# Patient Record
Sex: FEMALE | Race: WHITE | NOT HISPANIC OR LATINO | URBAN - METROPOLITAN AREA
[De-identification: names, ages, dates, MRNs, and addresses within clinical notes are randomized per-mention and may not be internally consistent; named-entity substitution may affect disease eponyms.]

---

## 2018-04-28 ENCOUNTER — HOSPITAL ENCOUNTER (EMERGENCY)
Facility: HOSPITAL | Age: 48
Discharge: HOME | End: 2018-04-28
Attending: EMERGENCY MEDICINE
Payer: MEDICARE

## 2018-04-28 VITALS
RESPIRATION RATE: 16 BRPM | TEMPERATURE: 98 F | WEIGHT: 90 LBS | DIASTOLIC BLOOD PRESSURE: 75 MMHG | OXYGEN SATURATION: 97 % | BODY MASS INDEX: 17.67 KG/M2 | HEART RATE: 88 BPM | HEIGHT: 60 IN | SYSTOLIC BLOOD PRESSURE: 106 MMHG

## 2018-04-28 DIAGNOSIS — R20.9 ALTERATION IN SENSORY PERCEPTION: ICD-10-CM

## 2018-04-28 DIAGNOSIS — R20.2 PARESTHESIAS: ICD-10-CM

## 2018-04-28 DIAGNOSIS — E87.1 HYPONATREMIA: Primary | ICD-10-CM

## 2018-04-28 DIAGNOSIS — F19.10 DRUG ABUSE: ICD-10-CM

## 2018-04-28 LAB
ALBUMIN SERPL BCP-MCNC: 4 G/DL
ALP SERPL-CCNC: 65 U/L
ALT SERPL W/O P-5'-P-CCNC: 21 U/L
AMPHET+METHAMPHET UR QL: NORMAL
ANION GAP SERPL CALC-SCNC: 6 MMOL/L
AST SERPL-CCNC: 24 U/L
BACTERIA #/AREA URNS HPF: NORMAL /HPF
BARBITURATES UR QL SCN>200 NG/ML: NEGATIVE
BASOPHILS # BLD AUTO: 0.07 K/UL
BASOPHILS NFR BLD: 1.2 %
BENZODIAZ UR QL SCN>200 NG/ML: NEGATIVE
BILIRUB SERPL-MCNC: 0.1 MG/DL
BILIRUB UR QL STRIP: NEGATIVE
BUN SERPL-MCNC: 7 MG/DL
BZE UR QL SCN: NEGATIVE
CALCIUM SERPL-MCNC: 9.2 MG/DL
CANNABINOIDS UR QL SCN: NORMAL
CHLORIDE SERPL-SCNC: 93 MMOL/L
CLARITY UR: CLEAR
CO2 SERPL-SCNC: 27 MMOL/L
COLOR UR: YELLOW
CREAT SERPL-MCNC: 0.6 MG/DL
CREAT UR-MCNC: 116.7 MG/DL
DIFFERENTIAL METHOD: ABNORMAL
EOSINOPHIL # BLD AUTO: 0.1 K/UL
EOSINOPHIL NFR BLD: 2.3 %
ERYTHROCYTE [DISTWIDTH] IN BLOOD BY AUTOMATED COUNT: 11.7 %
EST. GFR  (AFRICAN AMERICAN): >60 ML/MIN/1.73 M^2
EST. GFR  (NON AFRICAN AMERICAN): >60 ML/MIN/1.73 M^2
GLUCOSE SERPL-MCNC: 76 MG/DL
GLUCOSE UR QL STRIP: NEGATIVE
HCT VFR BLD AUTO: 35.5 %
HGB BLD-MCNC: 12.4 G/DL
HGB UR QL STRIP: NEGATIVE
HYALINE CASTS #/AREA URNS LPF: 0 /LPF
IMM GRANULOCYTES # BLD AUTO: 0.01 K/UL
IMM GRANULOCYTES NFR BLD AUTO: 0.2 %
KETONES UR QL STRIP: ABNORMAL
LEUKOCYTE ESTERASE UR QL STRIP: NEGATIVE
LYMPHOCYTES # BLD AUTO: 2.1 K/UL
LYMPHOCYTES NFR BLD: 36.9 %
MAGNESIUM SERPL-MCNC: 1.9 MG/DL
MCH RBC QN AUTO: 30 PG
MCHC RBC AUTO-ENTMCNC: 34.9 G/DL
MCV RBC AUTO: 86 FL
MICROSCOPIC COMMENT: NORMAL
MONOCYTES # BLD AUTO: 0.5 K/UL
MONOCYTES NFR BLD: 9 %
NEUTROPHILS # BLD AUTO: 2.8 K/UL
NEUTROPHILS NFR BLD: 50.4 %
NITRITE UR QL STRIP: NEGATIVE
NRBC BLD-RTO: 0 /100 WBC
OPIATES UR QL SCN: NEGATIVE
PCP UR QL SCN>25 NG/ML: NEGATIVE
PH UR STRIP: 7 [PH] (ref 5–8)
PLATELET # BLD AUTO: 255 K/UL
PMV BLD AUTO: 9.9 FL
POTASSIUM SERPL-SCNC: 4 MMOL/L
PROT SERPL-MCNC: 6.9 G/DL
PROT UR QL STRIP: ABNORMAL
RBC # BLD AUTO: 4.13 M/UL
RBC #/AREA URNS HPF: 1 /HPF (ref 0–4)
SODIUM SERPL-SCNC: 126 MMOL/L
SP GR UR STRIP: 1.02 (ref 1–1.03)
TOXICOLOGY INFORMATION: NORMAL
URN SPEC COLLECT METH UR: ABNORMAL
UROBILINOGEN UR STRIP-ACNC: NEGATIVE EU/DL
WBC # BLD AUTO: 5.64 K/UL
WBC #/AREA URNS HPF: 1 /HPF (ref 0–5)

## 2018-04-28 PROCEDURE — 81000 URINALYSIS NONAUTO W/SCOPE: CPT

## 2018-04-28 PROCEDURE — 83735 ASSAY OF MAGNESIUM: CPT

## 2018-04-28 PROCEDURE — 80053 COMPREHEN METABOLIC PANEL: CPT

## 2018-04-28 PROCEDURE — 70450 CT HEAD/BRAIN W/O DYE: CPT | Mod: TC

## 2018-04-28 PROCEDURE — 70450 CT HEAD/BRAIN W/O DYE: CPT | Mod: 26,,, | Performed by: RADIOLOGY

## 2018-04-28 PROCEDURE — 85025 COMPLETE CBC W/AUTO DIFF WBC: CPT

## 2018-04-28 PROCEDURE — 80307 DRUG TEST PRSMV CHEM ANLYZR: CPT

## 2018-04-28 PROCEDURE — 99284 EMERGENCY DEPT VISIT MOD MDM: CPT | Mod: 25

## 2018-04-28 RX ORDER — ESCITALOPRAM OXALATE 20 MG/1
20 TABLET ORAL DAILY
COMMUNITY

## 2018-04-28 RX ORDER — QUETIAPINE FUMARATE 300 MG/1
TABLET, FILM COATED ORAL
COMMUNITY

## 2018-04-28 RX ORDER — TRAZODONE HYDROCHLORIDE 100 MG/1
400 TABLET ORAL NIGHTLY
COMMUNITY

## 2018-04-28 RX ORDER — OXCARBAZEPINE 600 MG/1
600 TABLET, FILM COATED ORAL 2 TIMES DAILY
COMMUNITY

## 2018-04-28 NOTE — ED PROVIDER NOTES
Encounter Date: 4/28/2018       History     Chief Complaint   Patient presents with    Arm Problem     left arm numb from elbow down, started yesterday     36-48 hours of left arm and leg pain decreased sensation   Left arm from elbow inferior to hand involving the thumb, index and long fingers   Left leg post distal calf and foot     Denies weakness and none found on detailed exam of upper and lower extremity   Complains of headache bifrontal   Reports cough - non productive   No other chest symptoms - no hemoptysis             Review of patient's allergies indicates:   Allergen Reactions    Vancomycin analogues      Past Medical History:   Diagnosis Date    Anxiety     Bipolar 1 disorder     Depression     Overdose 2015    Suicidal ideation 2015     Past Surgical History:   Procedure Laterality Date    HYSTERECTOMY      TONSILLECTOMY      WRIST SURGERY Right      History reviewed. No pertinent family history.  Social History   Substance Use Topics    Smoking status: Current Every Day Smoker    Smokeless tobacco: Not on file    Alcohol use Yes      Comment: social     Review of Systems   Constitutional: Negative.    Respiratory: Positive for cough. Negative for shortness of breath, wheezing and stridor.    Cardiovascular: Negative for chest pain, palpitations and leg swelling.   Gastrointestinal: Negative for abdominal pain, constipation, nausea and vomiting.   Neurological: Positive for numbness and headaches. Negative for dizziness, tremors, seizures, syncope, facial asymmetry, speech difficulty, weakness and light-headedness.   Hematological: Negative.    All other systems reviewed and are negative.      Physical Exam     Initial Vitals [04/28/18 1517]   BP Pulse Resp Temp SpO2   92/79 (!) 120 20 98.3 °F (36.8 °C) 95 %      MAP       83.33         Physical Exam    Nursing note and vitals reviewed.  Constitutional: She appears well-developed and well-nourished. She is not diaphoretic. No distress.    HENT:   Head: Normocephalic and atraumatic.   Nose: Nose normal.   Mouth/Throat: Oropharynx is clear and moist. No oropharyngeal exudate.   Eyes: Conjunctivae and EOM are normal. Pupils are equal, round, and reactive to light. Right eye exhibits no discharge. Left eye exhibits no discharge. No scleral icterus.   Neck: Normal range of motion. Neck supple.   Cardiovascular: Normal rate, regular rhythm, normal heart sounds and intact distal pulses. Exam reveals no gallop and no friction rub.    No murmur heard.  Pulmonary/Chest: Breath sounds normal. No respiratory distress. She has no wheezes. She has no rhonchi. She has no rales.   Abdominal: Soft. Bowel sounds are normal. She exhibits no distension and no mass. There is no tenderness. There is no rebound and no guarding.   Musculoskeletal: Normal range of motion. She exhibits no edema or tenderness.   Lymphadenopathy:     She has no cervical adenopathy.   Neurological: She is alert and oriented to person, place, and time. She has normal strength. No cranial nerve deficit or sensory deficit.   NO objective findings    Skin: Skin is warm and dry. Capillary refill takes less than 2 seconds. No rash noted. No erythema. No pallor.   Psychiatric: She has a normal mood and affect. Her behavior is normal. Judgment and thought content normal.         ED Course   Procedures  Labs Reviewed   CBC W/ AUTO DIFFERENTIAL - Abnormal; Notable for the following:        Result Value    Hematocrit 35.5 (*)     All other components within normal limits   COMPREHENSIVE METABOLIC PANEL - Abnormal; Notable for the following:     Sodium 126 (*)     Chloride 93 (*)     Anion Gap 6 (*)     All other components within normal limits   MAGNESIUM   URINALYSIS   DRUG SCREEN PANEL, URINE EMERGENCY        Results for orders placed or performed during the hospital encounter of 04/28/18   Complete Blood Count (CBC)   Result Value Ref Range    WBC 5.64 3.90 - 12.70 K/uL    RBC 4.13 4.00 - 5.40 M/uL     Hemoglobin 12.4 12.0 - 16.0 g/dL    Hematocrit 35.5 (L) 37.0 - 48.5 %    MCV 86 82 - 98 fL    MCH 30.0 27.0 - 31.0 pg    MCHC 34.9 32.0 - 36.0 g/dL    RDW 11.7 11.5 - 14.5 %    Platelets 255 150 - 350 K/uL    MPV 9.9 9.2 - 12.9 fL    Immature Granulocytes 0.2 0.0 - 0.5 %    Gran # (ANC) 2.8 1.8 - 7.7 K/uL    Immature Grans (Abs) 0.01 0.00 - 0.04 K/uL    Lymph # 2.1 1.0 - 4.8 K/uL    Mono # 0.5 0.3 - 1.0 K/uL    Eos # 0.1 0.0 - 0.5 K/uL    Baso # 0.07 0.00 - 0.20 K/uL    nRBC 0 0 /100 WBC    Gran% 50.4 38.0 - 73.0 %    Lymph% 36.9 18.0 - 48.0 %    Mono% 9.0 4.0 - 15.0 %    Eosinophil% 2.3 0.0 - 8.0 %    Basophil% 1.2 0.0 - 1.9 %    Differential Method Automated    Comprehensive Metabolic Panel (CMP)   Result Value Ref Range    Sodium 126 (L) 136 - 145 mmol/L    Potassium 4.0 3.5 - 5.1 mmol/L    Chloride 93 (L) 95 - 110 mmol/L    CO2 27 23 - 29 mmol/L    Glucose 76 70 - 110 mg/dL    BUN, Bld 7 6 - 20 mg/dL    Creatinine 0.6 0.5 - 1.4 mg/dL    Calcium 9.2 8.7 - 10.5 mg/dL    Total Protein 6.9 6.0 - 8.4 g/dL    Albumin 4.0 3.5 - 5.2 g/dL    Total Bilirubin 0.1 0.1 - 1.0 mg/dL    Alkaline Phosphatase 65 55 - 135 U/L    AST 24 10 - 40 U/L    ALT 21 10 - 44 U/L    Anion Gap 6 (L) 8 - 16 mmol/L    eGFR if African American >60.0 >60 mL/min/1.73 m^2    eGFR if non African American >60.0 >60 mL/min/1.73 m^2   Magnesium   Result Value Ref Range    Magnesium 1.9 1.6 - 2.6 mg/dL          Medical Decision Making:   Differential Diagnosis:   CVA.  CARPAL TUNNEL.  PARESTHESIAS  ELECTROLYTE ABNORMALITIES.  ILEGAL  DRUHS  Clinical Tests:   Lab Tests: Reviewed       <> Summary of Lab: Results for AISHWARYA STALEY (MRN [id number]) as of 4/28/2018 19:02    4/28/2018 16:40  WBC: 5.64  RBC: 4.13  Hemoglobin: 12.4  Hematocrit: 35.5 (L)  MCV: 86  MCH: 30.0  MCHC: 34.9  RDW: 11.7  Platelets: 255  MPV: 9.9  Gran%: 50.4  Gran # (ANC): 2.8  Immature Granulocytes: 0.2  Immature Grans (Abs): 0.01  Lymph%: 36.9  Lymph #: 2.1  Mono%: 9.0  Mono #:  0.5  Eosinophil%: 2.3  Eos #: 0.1  Basophil%: 1.2  Baso #: 0.07  nRBC: 0  Sodium: 126 (L)  Potassium: 4.0  Chloride: 93 (L)  CO2: 27  Anion Gap: 6 (L)  BUN, Bld: 7  Creatinine: 0.6  eGFR if non African American: >60.0  eGFR if African American: >60.0  Glucose: 76  Calcium: 9.2  Magnesium: 1.9  Alkaline Phosphatase: 65  Total Protein: 6.9  Albumin: 4.0  Total Bilirubin: 0.1  AST: 24  ALT: 21  Differential Method: Automated    4/28/2018 17:36  Benzodiazepines: Negative  Phencyclidine: Negative  Cocaine (Metab.): Negative  Opiate Scrn, Ur: Negative  Barbiturate Screen, Ur: Negative  Amphetamine Screen, Ur: Presumptive Positive  Marijuana (THC) Metabolite: Presumptive Positive  Specimen UA: Urine, Clean Catch  Color, UA: Yellow  pH, UA: 7.0  Specific Gravity, UA: 1.020  Appearance, UA: Clear  Protein, UA: 2+ (A)  Glucose, UA: Negative  Ketones, UA: Trace (A)  Occult Blood UA: Negative  Nitrite, UA: Negative  Urobilinogen, UA: Negative  Bilirubin (UA): Negative  Leukocytes, UA: Negative  RBC, UA: 1  WBC, UA: 1  Bacteria, UA: Rare  Hyaline Casts, UA: 0  Microscopic Comment: SEE COMMENT  Creatinine, Random Ur: 116.7  Toxicology Information: SEE COMMENT    ED Management:  PT CARE WAS TRANSFER TO ME FROM DR BINGHAM, TO FOLLOW CT AND LABS. PT SAID SHE STILL HAS THE PARESTHESIAS IN HIS HANDS AND FACE. SHE WAS OFFER ADMISSION AND SHE DECLINE, SHE SAID SHE WILL FOLLOW WITH HER PCP ON Monday, SHE HAD SAME PROBLEM IN THE PAST WHEN HER SODIUM WAS LOW, SHE SAID IS DUE TO HER TRILEPTAL. SHE HAS POSITIVE TEST FOR AMPHETAMINES AND MARIJUANA. PT IS NOT TACHYCARDIC ANY MORE  FOLLOW UP WITH PCP ON MONDAY        Imaging Results          CT Head Without Contrast (Final result)  Result time 04/28/18 16:31:12    Final result by Uziel Link MD (04/28/18 16:31:12)                 Impression:      No acute findings and no significant change relative to August 8, 2012      Electronically signed by: Uziel Link  MD  Date:    04/28/2018  Time:    16:31             Narrative:    EXAMINATION:  CT HEAD WITHOUT CONTRAST    CLINICAL HISTORY:  Sensory alteration / loss LUE and LLE;    TECHNIQUE:  Low dose axial images were obtained through the head.  Coronal and sagittal reformations were also performed. Contrast was not administered.    COMPARISON:  August 8, 2012    FINDINGS:  There is a well-defined 8 mm low-density inferior in the left temporal lobe.  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  This is stable relative the previous examination and no further evaluation is advised.  The brain and ventricles are otherwise unremarkable.  There is no evidence of mass effect or midline shift.  No abnormal extra-axial collections are seen.  The frontal sinuses are hypoplastic.  The visualized paranasal sinuses and mastoids are unremarkable                                            ED Course as of Apr 28 1903   Sat Apr 28, 2018   1732 Transfer of care @ 1800 pending Urine and UDS  CT brain negative  Hyponatremia may be causative in symptoms and will check urine Sg and discuss with her - her water intake and attempt to define.     [KG]      ED Course User Index  [KG] Evgeny Castillo MD     Clinical Impression:   The primary encounter diagnosis was Hyponatremia. A diagnosis of Alteration in sensory perception was also pertinent to this visit.  HYPONATREMIA.  PARESTHESIAS  DRUG ABUSE    Disposition:   Disposition: Discharged  Condition: Stable                        Leslee Schaeffer MD  04/28/18 3521

## 2018-08-21 ENCOUNTER — HOSPITAL ENCOUNTER (OUTPATIENT)
Facility: HOSPITAL | Age: 48
Discharge: HOME | End: 2018-08-24
Attending: EMERGENCY MEDICINE | Admitting: INTERNAL MEDICINE
Payer: MEDICARE

## 2018-08-21 DIAGNOSIS — R53.83 FATIGUE DUE TO DEPRESSION: Primary | ICD-10-CM

## 2018-08-21 DIAGNOSIS — R41.82 ALTERED MENTAL STATUS: ICD-10-CM

## 2018-08-21 DIAGNOSIS — F32.A FATIGUE DUE TO DEPRESSION: Primary | ICD-10-CM

## 2018-08-21 DIAGNOSIS — F50.00 ANOREXIA MENTALIS: ICD-10-CM

## 2018-08-21 PROBLEM — E86.0 DEHYDRATION: Status: ACTIVE | Noted: 2018-08-21

## 2018-08-21 PROBLEM — N39.0 UTI (URINARY TRACT INFECTION): Status: ACTIVE | Noted: 2018-08-21

## 2018-08-21 LAB
ALBUMIN SERPL BCP-MCNC: 2.6 G/DL
ALP SERPL-CCNC: 71 U/L
ALT SERPL W/O P-5'-P-CCNC: 17 U/L
AMPHET+METHAMPHET UR QL: NORMAL
ANION GAP SERPL CALC-SCNC: 12 MMOL/L
AST SERPL-CCNC: 21 U/L
BACTERIA #/AREA URNS HPF: ABNORMAL /HPF
BARBITURATES UR QL SCN>200 NG/ML: NEGATIVE
BASOPHILS # BLD AUTO: 0.04 K/UL
BASOPHILS NFR BLD: 0.4 %
BENZODIAZ UR QL SCN>200 NG/ML: NORMAL
BILIRUB SERPL-MCNC: 0.4 MG/DL
BILIRUB UR QL STRIP: ABNORMAL
BUN SERPL-MCNC: 21 MG/DL
BZE UR QL SCN: NEGATIVE
CALCIUM SERPL-MCNC: 8.7 MG/DL
CANNABINOIDS UR QL SCN: NORMAL
CHLORIDE SERPL-SCNC: 97 MMOL/L
CLARITY UR: CLEAR
CO2 SERPL-SCNC: 26 MMOL/L
COLOR UR: YELLOW
CREAT SERPL-MCNC: 0.6 MG/DL
CREAT UR-MCNC: 224.6 MG/DL
DIFFERENTIAL METHOD: ABNORMAL
EOSINOPHIL # BLD AUTO: 0 K/UL
EOSINOPHIL NFR BLD: 0.1 %
ERYTHROCYTE [DISTWIDTH] IN BLOOD BY AUTOMATED COUNT: 11.9 %
EST. GFR  (AFRICAN AMERICAN): >60 ML/MIN/1.73 M^2
EST. GFR  (NON AFRICAN AMERICAN): >60 ML/MIN/1.73 M^2
ETHANOL SERPL-MCNC: <5 MG/DL
GLUCOSE SERPL-MCNC: 112 MG/DL
GLUCOSE UR QL STRIP: NEGATIVE
HCT VFR BLD AUTO: 35.9 %
HGB BLD-MCNC: 12.4 G/DL
HGB UR QL STRIP: ABNORMAL
HYALINE CASTS #/AREA URNS LPF: 0 /LPF
IMM GRANULOCYTES # BLD AUTO: 0.04 K/UL
IMM GRANULOCYTES NFR BLD AUTO: 0.4 %
KETONES UR QL STRIP: ABNORMAL
LACTATE SERPL-SCNC: 0.9 MMOL/L
LEUKOCYTE ESTERASE UR QL STRIP: ABNORMAL
LYMPHOCYTES # BLD AUTO: 0.6 K/UL
LYMPHOCYTES NFR BLD: 7.2 %
MCH RBC QN AUTO: 29.3 PG
MCHC RBC AUTO-ENTMCNC: 34.5 G/DL
MCV RBC AUTO: 85 FL
MICROSCOPIC COMMENT: ABNORMAL
MONOCYTES # BLD AUTO: 1.1 K/UL
MONOCYTES NFR BLD: 12 %
NEUTROPHILS # BLD AUTO: 7.1 K/UL
NEUTROPHILS NFR BLD: 79.9 %
NITRITE UR QL STRIP: NEGATIVE
NRBC BLD-RTO: 0 /100 WBC
OPIATES UR QL SCN: NEGATIVE
PCP UR QL SCN>25 NG/ML: NEGATIVE
PH UR STRIP: 7 [PH] (ref 5–8)
PLATELET # BLD AUTO: 200 K/UL
PMV BLD AUTO: 10 FL
POTASSIUM SERPL-SCNC: 3.5 MMOL/L
PROT SERPL-MCNC: 6.6 G/DL
PROT UR QL STRIP: ABNORMAL
RBC # BLD AUTO: 4.23 M/UL
RBC #/AREA URNS HPF: 3 /HPF (ref 0–4)
SODIUM SERPL-SCNC: 135 MMOL/L
SP GR UR STRIP: 1.02 (ref 1–1.03)
SQUAMOUS #/AREA URNS HPF: 3 /HPF
TOXICOLOGY INFORMATION: NORMAL
URN SPEC COLLECT METH UR: ABNORMAL
UROBILINOGEN UR STRIP-ACNC: 1 EU/DL
WBC # BLD AUTO: 8.92 K/UL
WBC #/AREA URNS HPF: 15 /HPF (ref 0–5)

## 2018-08-21 PROCEDURE — 80053 COMPREHEN METABOLIC PANEL: CPT

## 2018-08-21 PROCEDURE — 25000003 PHARM REV CODE 250

## 2018-08-21 PROCEDURE — 96376 TX/PRO/DX INJ SAME DRUG ADON: CPT

## 2018-08-21 PROCEDURE — 25000003 PHARM REV CODE 250: Performed by: EMERGENCY MEDICINE

## 2018-08-21 PROCEDURE — 25000003 PHARM REV CODE 250: Performed by: INTERNAL MEDICINE

## 2018-08-21 PROCEDURE — 63600175 PHARM REV CODE 636 W HCPCS: Performed by: INTERNAL MEDICINE

## 2018-08-21 PROCEDURE — 63600175 PHARM REV CODE 636 W HCPCS

## 2018-08-21 PROCEDURE — 80320 DRUG SCREEN QUANTALCOHOLS: CPT

## 2018-08-21 PROCEDURE — 87086 URINE CULTURE/COLONY COUNT: CPT

## 2018-08-21 PROCEDURE — G0378 HOSPITAL OBSERVATION PER HR: HCPCS

## 2018-08-21 PROCEDURE — 71045 X-RAY EXAM CHEST 1 VIEW: CPT | Mod: TC,FY

## 2018-08-21 PROCEDURE — 87077 CULTURE AEROBIC IDENTIFY: CPT

## 2018-08-21 PROCEDURE — 70450 CT HEAD/BRAIN W/O DYE: CPT | Mod: TC

## 2018-08-21 PROCEDURE — 80307 DRUG TEST PRSMV CHEM ANLYZR: CPT

## 2018-08-21 PROCEDURE — 83605 ASSAY OF LACTIC ACID: CPT

## 2018-08-21 PROCEDURE — 96361 HYDRATE IV INFUSION ADD-ON: CPT

## 2018-08-21 PROCEDURE — 71045 X-RAY EXAM CHEST 1 VIEW: CPT | Mod: 26,,, | Performed by: RADIOLOGY

## 2018-08-21 PROCEDURE — 99285 EMERGENCY DEPT VISIT HI MDM: CPT | Mod: 25

## 2018-08-21 PROCEDURE — 87186 SC STD MICRODIL/AGAR DIL: CPT

## 2018-08-21 PROCEDURE — 81000 URINALYSIS NONAUTO W/SCOPE: CPT | Mod: 59

## 2018-08-21 PROCEDURE — 94760 N-INVAS EAR/PLS OXIMETRY 1: CPT

## 2018-08-21 PROCEDURE — 96375 TX/PRO/DX INJ NEW DRUG ADDON: CPT

## 2018-08-21 PROCEDURE — S4991 NICOTINE PATCH NONLEGEND: HCPCS

## 2018-08-21 PROCEDURE — 87040 BLOOD CULTURE FOR BACTERIA: CPT

## 2018-08-21 PROCEDURE — 70450 CT HEAD/BRAIN W/O DYE: CPT | Mod: 26,,, | Performed by: RADIOLOGY

## 2018-08-21 PROCEDURE — 85025 COMPLETE CBC W/AUTO DIFF WBC: CPT

## 2018-08-21 PROCEDURE — 94761 N-INVAS EAR/PLS OXIMETRY MLT: CPT

## 2018-08-21 RX ORDER — FOLIC ACID/MULTIVIT,IRON,MINER 0.4MG-18MG
1 TABLET ORAL DAILY
Status: DISCONTINUED | OUTPATIENT
Start: 2018-08-22 | End: 2018-08-24 | Stop reason: HOSPADM

## 2018-08-21 RX ORDER — QUETIAPINE FUMARATE 100 MG/1
300 TABLET, FILM COATED ORAL DAILY
Status: DISCONTINUED | OUTPATIENT
Start: 2018-08-22 | End: 2018-08-24 | Stop reason: HOSPADM

## 2018-08-21 RX ORDER — ONDANSETRON 2 MG/ML
4 INJECTION INTRAMUSCULAR; INTRAVENOUS EVERY 6 HOURS PRN
Status: DISCONTINUED | OUTPATIENT
Start: 2018-08-21 | End: 2018-08-24 | Stop reason: HOSPADM

## 2018-08-21 RX ORDER — TRAZODONE HYDROCHLORIDE 50 MG/1
400 TABLET ORAL NIGHTLY
Status: DISCONTINUED | OUTPATIENT
Start: 2018-08-21 | End: 2018-08-24 | Stop reason: HOSPADM

## 2018-08-21 RX ORDER — OXCARBAZEPINE 600 MG/1
600 TABLET, FILM COATED ORAL 2 TIMES DAILY
Status: DISCONTINUED | OUTPATIENT
Start: 2018-08-21 | End: 2018-08-24 | Stop reason: HOSPADM

## 2018-08-21 RX ORDER — SODIUM CHLORIDE 9 MG/ML
INJECTION, SOLUTION INTRAVENOUS CONTINUOUS
Status: DISCONTINUED | OUTPATIENT
Start: 2018-08-21 | End: 2018-08-24 | Stop reason: HOSPADM

## 2018-08-21 RX ORDER — SODIUM CHLORIDE 9 MG/ML
1000 INJECTION, SOLUTION INTRAVENOUS
Status: COMPLETED | OUTPATIENT
Start: 2018-08-21 | End: 2018-08-21

## 2018-08-21 RX ORDER — ACETAMINOPHEN 325 MG/1
650 TABLET ORAL EVERY 4 HOURS PRN
Status: DISCONTINUED | OUTPATIENT
Start: 2018-08-21 | End: 2018-08-24 | Stop reason: HOSPADM

## 2018-08-21 RX ORDER — IBUPROFEN 200 MG
1 TABLET ORAL DAILY
Status: DISCONTINUED | OUTPATIENT
Start: 2018-08-22 | End: 2018-08-24 | Stop reason: HOSPADM

## 2018-08-21 RX ORDER — NYSTATIN 100000 [USP'U]/ML
5 SUSPENSION ORAL 4 TIMES DAILY
Status: DISCONTINUED | OUTPATIENT
Start: 2018-08-21 | End: 2018-08-24 | Stop reason: HOSPADM

## 2018-08-21 RX ORDER — PANTOPRAZOLE SODIUM 40 MG/10ML
40 INJECTION, POWDER, LYOPHILIZED, FOR SOLUTION INTRAVENOUS DAILY
Status: DISCONTINUED | OUTPATIENT
Start: 2018-08-22 | End: 2018-08-24 | Stop reason: HOSPADM

## 2018-08-21 RX ORDER — THIAMINE HCL 100 MG
100 TABLET ORAL DAILY
Status: DISCONTINUED | OUTPATIENT
Start: 2018-08-22 | End: 2018-08-24 | Stop reason: HOSPADM

## 2018-08-21 RX ORDER — ESCITALOPRAM OXALATE 10 MG/1
20 TABLET ORAL DAILY
Status: DISCONTINUED | OUTPATIENT
Start: 2018-08-22 | End: 2018-08-24 | Stop reason: HOSPADM

## 2018-08-21 RX ORDER — IBUPROFEN 200 MG
TABLET ORAL
Status: COMPLETED
Start: 2018-08-21 | End: 2018-08-21

## 2018-08-21 RX ORDER — CEFTRIAXONE 1 G/50ML
INJECTION, SOLUTION INTRAVENOUS
Status: COMPLETED
Start: 2018-08-21 | End: 2018-08-21

## 2018-08-21 RX ORDER — AZITHROMYCIN 100 MG/ML
INJECTION, POWDER, LYOPHILIZED, FOR SOLUTION INTRAVENOUS
Status: COMPLETED
Start: 2018-08-21 | End: 2018-08-21

## 2018-08-21 RX ADMIN — NICOTINE: 21 PATCH, EXTENDED RELEASE TRANSDERMAL at 09:08

## 2018-08-21 RX ADMIN — TRAZODONE HYDROCHLORIDE 400 MG: 50 TABLET ORAL at 10:08

## 2018-08-21 RX ADMIN — CEFTRIAXONE SODIUM 1 G: 1 INJECTION, POWDER, FOR SOLUTION INTRAMUSCULAR; INTRAVENOUS at 07:08

## 2018-08-21 RX ADMIN — SODIUM CHLORIDE 1000 ML: 9 INJECTION, SOLUTION INTRAVENOUS at 04:08

## 2018-08-21 RX ADMIN — NYSTATIN 500000 UNITS: 500000 SUSPENSION ORAL at 10:08

## 2018-08-21 RX ADMIN — SODIUM CHLORIDE: 9 INJECTION, SOLUTION INTRAVENOUS at 07:08

## 2018-08-21 RX ADMIN — AZITHROMYCIN MONOHYDRATE: 500 INJECTION, POWDER, LYOPHILIZED, FOR SOLUTION INTRAVENOUS at 09:08

## 2018-08-21 RX ADMIN — AZITHROMYCIN MONOHYDRATE 500 MG: 500 INJECTION, POWDER, LYOPHILIZED, FOR SOLUTION INTRAVENOUS at 08:08

## 2018-08-21 RX ADMIN — SODIUM CHLORIDE: 9 INJECTION, SOLUTION INTRAVENOUS at 10:08

## 2018-08-21 RX ADMIN — CEFTRIAXONE 1 G: 1 INJECTION, SOLUTION INTRAVENOUS at 07:08

## 2018-08-21 NOTE — SUBJECTIVE & OBJECTIVE
Past Medical History:   Diagnosis Date    Anxiety     Bipolar 1 disorder     Depression     Overdose 2015    Suicidal ideation 2015       Past Surgical History:   Procedure Laterality Date    HYSTERECTOMY      TONSILLECTOMY      WRIST SURGERY Right        Review of patient's allergies indicates:   Allergen Reactions    Vancomycin analogues        No current facility-administered medications on file prior to encounter.      Current Outpatient Medications on File Prior to Encounter   Medication Sig    escitalopram oxalate (LEXAPRO) 20 MG tablet Take 20 mg by mouth once daily.    OXcarbazepine (TRILEPTAL) 600 MG Tab Take 600 mg by mouth 2 (two) times daily.    QUEtiapine (SEROQUEL) 300 MG Tab Take by mouth.    traZODone (DESYREL) 100 MG tablet Take 400 mg by mouth every evening.     Family History     None        Tobacco Use    Smoking status: Current Every Day Smoker     Packs/day: 0.50     Types: Cigarettes    Smokeless tobacco: Never Used   Substance and Sexual Activity    Alcohol use: Yes     Comment: social    Drug use: Yes     Types: Marijuana    Sexual activity: Yes     Review of Systems   Constitutional: Positive for activity change, appetite change, fatigue and unexpected weight change.        Very Disheveled with significant weight loss   HENT: Positive for congestion, dental problem, mouth sores, postnasal drip, rhinorrhea and sinus pressure. Negative for ear discharge, facial swelling, hearing loss, nosebleeds, sinus pain, sore throat, trouble swallowing and voice change.    Eyes: Positive for redness and itching. Negative for photophobia, pain and visual disturbance.   Respiratory: Positive for cough, chest tightness, shortness of breath and wheezing.    Cardiovascular: Positive for palpitations. Negative for chest pain and leg swelling.   Gastrointestinal: Positive for nausea. Negative for abdominal distention, abdominal pain, blood in stool, constipation and diarrhea.   Endocrine:  Negative.    Genitourinary: Positive for dysuria, flank pain, frequency and urgency. Negative for decreased urine volume, difficulty urinating, genital sores, hematuria, menstrual problem, pelvic pain, vaginal bleeding and vaginal discharge.   Musculoskeletal: Positive for arthralgias, back pain and myalgias. Negative for gait problem and joint swelling.   Allergic/Immunologic: Negative.    Neurological: Positive for dizziness, weakness, light-headedness and headaches.   Hematological: Negative.    Psychiatric/Behavioral: Positive for agitation, decreased concentration and dysphoric mood. The patient is nervous/anxious.      Objective:     Vital Signs (Most Recent):  Temp: 98.7 °F (37.1 °C) (08/21/18 1616)  Pulse: 82 (08/21/18 1616)  Resp: 20 (08/21/18 1616)  BP: 97/78 (08/21/18 1616)  SpO2: 96 % (08/21/18 1616) Vital Signs (24h Range):  Temp:  [98.7 °F (37.1 °C)] 98.7 °F (37.1 °C)  Pulse:  [82] 82  Resp:  [20] 20  SpO2:  [96 %] 96 %  BP: (97)/(78) 97/78     Weight: 37.2 kg (82 lb)  Body mass index is 16.01 kg/m².    Physical Exam   Constitutional: She is oriented to person, place, and time. She appears well-developed. She appears distressed.   Disheveled and appearing much older than stated age.     HENT:   Mouth/Throat: Oropharyngeal exudate present.   Eyes: Right eye exhibits discharge. Left eye exhibits discharge.   Neck: Normal range of motion. Neck supple. No JVD present. No tracheal deviation present. No thyromegaly present.   Cardiovascular: Normal rate, regular rhythm, normal heart sounds and intact distal pulses.   Pulmonary/Chest: Effort normal. No stridor. No respiratory distress. She has wheezes. She has no rales. She exhibits tenderness.   Abdominal: Soft. Bowel sounds are normal.   Musculoskeletal: Normal range of motion. She exhibits tenderness.   Lymphadenopathy:     She has no cervical adenopathy.   Neurological: She is alert and oriented to person, place, and time. A cranial nerve deficit is  present.   Skin: Skin is warm and dry. Capillary refill takes less than 2 seconds.   Psychiatric: She has a normal mood and affect.   Nursing note reviewed.          Significant Labs: All pertinent labs within the past 24 hours have been reviewed.    Significant Imaging: I have reviewed and interpreted all pertinent imaging results/findings within the past 24 hours.

## 2018-08-21 NOTE — HPI
This is a 48 yo  Disheveled female that presented to ER with complaints of nausea, inability to sleep,  And appearing very dehydrated.  This patient has a history of Bipolar disorder, Depression,  Anxiety, Drug Abuse, and Hyponatremia.   Patient now states that she is extremely fatigued and has not eaten in days.  Complaining only that she wants to sleep.  Patient complains of headache and back ache.  Denies Dysuria or hematuria.  Denies Diarrhea but complains of nausea, no vomiting.

## 2018-08-21 NOTE — ED PROVIDER NOTES
Encounter Date: 8/21/2018       History     Chief Complaint   Patient presents with    Fatigue    Anorexia      She is not in any distress  Review of patient's allergies indicates:   Allergen Reactions    Vancomycin analogues      Past Medical History:   Diagnosis Date    Anxiety     Bipolar 1 disorder     Depression     Overdose 2015    Suicidal ideation 2015     Past Surgical History:   Procedure Laterality Date    HYSTERECTOMY      TONSILLECTOMY      WRIST SURGERY Right      No family history on file.  Social History     Tobacco Use    Smoking status: Current Every Day Smoker   Substance Use Topics    Alcohol use: Yes     Comment: social    Drug use: Yes     Types: Marijuana     Review of Systems   Constitutional: Positive for activity change and unexpected weight change.   Neurological: Positive for weakness.   Psychiatric/Behavioral: Positive for confusion, decreased concentration and sleep disturbance.   All other systems reviewed and are negative.      Physical Exam     Initial Vitals [08/21/18 1616]   BP Pulse Resp Temp SpO2   97/78 82 20 98.7 °F (37.1 °C) 96 %      MAP       --         Physical Exam    Nursing note and vitals reviewed.  Constitutional: Vital signs are normal. She appears well-developed and well-nourished. She appears lethargic. She appears cachectic. She has a sickly appearance. She appears ill.   HENT:   Head: Normocephalic and atraumatic.   Right Ear: External ear normal.   Left Ear: External ear normal.   Nose: Nose normal.   Mouth/Throat: Oropharynx is clear and moist. Mucous membranes are dry.   Eyes: Conjunctivae and EOM are normal. Pupils are equal, round, and reactive to light.   Neck: Normal range of motion. Neck supple. No thyromegaly present.   Cardiovascular: Normal rate, regular rhythm, normal heart sounds and intact distal pulses.   No murmur heard.  Pulmonary/Chest: Breath sounds normal. No stridor. No respiratory distress. She has no wheezes. She has no rales.    Abdominal: Soft. Bowel sounds are normal. There is no tenderness. There is no rebound and no guarding.   Musculoskeletal: Normal range of motion.   Lymphadenopathy:     She has no cervical adenopathy.   Neurological: She is oriented to person, place, and time. She has normal strength and normal reflexes. She appears lethargic. No cranial nerve deficit.   Skin: Skin is warm and dry. Capillary refill takes less than 2 seconds.   Psychiatric: She has a normal mood and affect. Her behavior is normal. Judgment and thought content normal.         ED Course   Procedures  Labs Reviewed   CULTURE, BLOOD   CBC W/ AUTO DIFFERENTIAL   COMPREHENSIVE METABOLIC PANEL   LACTIC ACID, PLASMA   URINALYSIS, REFLEX TO URINE CULTURE   DRUG SCREEN PANEL, URINE EMERGENCY   ALCOHOL,MEDICAL (ETHANOL)          Imaging Results    None                               Clinical Impression:   The primary encounter diagnosis was Fatigue due to depression. Diagnoses of Altered mental status and Anorexia mentalis were also pertinent to this visit.                             Amado Estrada MD  08/26/18 0422

## 2018-08-21 NOTE — H&P
[facility] - ED  Hospital Medicine  History & Physical    Patient Name: Bhavana Lion  MRN: [id number]  Admission Date: 8/21/2018  Attending Physician: Uziel Allan MD  Primary Care Provider: Primary Doctor No         Patient information was obtained from patient and ER records.     Subjective:     Principal Problem:Dehydration    Chief Complaint:   Chief Complaint   Patient presents with    Fatigue    Anorexia    Polydipsia        HPI: This is a 48 yo  Disheveled female that presented to ER with complaints of nausea, inability to sleep,  And appearing very dehydrated.  This patient has a history of Bipolar disorder, Depression,  Anxiety, Drug Abuse, and Hyponatremia.   Patient now states that she is extremely fatigued and has not eaten in days.  Complaining only that she wants to sleep.  Patient complains of headache and back ache.  Denies Dysuria or hematuria.  Denies Diarrhea but complains of nausea, no vomiting.    Past Medical History:   Diagnosis Date    Anxiety     Bipolar 1 disorder     Depression     Overdose 2015    Suicidal ideation 2015       Past Surgical History:   Procedure Laterality Date    HYSTERECTOMY      TONSILLECTOMY      WRIST SURGERY Right        Review of patient's allergies indicates:   Allergen Reactions    Vancomycin analogues        No current facility-administered medications on file prior to encounter.      Current Outpatient Medications on File Prior to Encounter   Medication Sig    escitalopram oxalate (LEXAPRO) 20 MG tablet Take 20 mg by mouth once daily.    OXcarbazepine (TRILEPTAL) 600 MG Tab Take 600 mg by mouth 2 (two) times daily.    QUEtiapine (SEROQUEL) 300 MG Tab Take by mouth.    traZODone (DESYREL) 100 MG tablet Take 400 mg by mouth every evening.     Family History     None        Tobacco Use    Smoking status: Current Every Day Smoker     Packs/day: 0.50     Types: Cigarettes    Smokeless tobacco: Never Used   Substance and  Sexual Activity    Alcohol use: Yes     Comment: social    Drug use: Yes     Types: Marijuana    Sexual activity: Yes     Review of Systems   Constitutional: Positive for activity change, appetite change, fatigue and unexpected weight change.        Very Disheveled with significant weight loss   HENT: Positive for congestion, dental problem, mouth sores, postnasal drip, rhinorrhea and sinus pressure. Negative for ear discharge, facial swelling, hearing loss, nosebleeds, sinus pain, sore throat, trouble swallowing and voice change.    Eyes: Positive for redness and itching. Negative for photophobia, pain and visual disturbance.   Respiratory: Positive for cough, chest tightness, shortness of breath and wheezing.    Cardiovascular: Positive for palpitations. Negative for chest pain and leg swelling.   Gastrointestinal: Positive for nausea. Negative for abdominal distention, abdominal pain, blood in stool, constipation and diarrhea.   Endocrine: Negative.    Genitourinary: Positive for dysuria, flank pain, frequency and urgency. Negative for decreased urine volume, difficulty urinating, genital sores, hematuria, menstrual problem, pelvic pain, vaginal bleeding and vaginal discharge.   Musculoskeletal: Positive for arthralgias, back pain and myalgias. Negative for gait problem and joint swelling.   Allergic/Immunologic: Negative.    Neurological: Positive for dizziness, weakness, light-headedness and headaches.   Hematological: Negative.    Psychiatric/Behavioral: Positive for agitation, decreased concentration and dysphoric mood. The patient is nervous/anxious.      Objective:     Vital Signs (Most Recent):  Temp: 98.7 °F (37.1 °C) (08/21/18 1616)  Pulse: 82 (08/21/18 1616)  Resp: 20 (08/21/18 1616)  BP: 97/78 (08/21/18 1616)  SpO2: 96 % (08/21/18 1616) Vital Signs (24h Range):  Temp:  [98.7 °F (37.1 °C)] 98.7 °F (37.1 °C)  Pulse:  [82] 82  Resp:  [20] 20  SpO2:  [96 %] 96 %  BP: (97)/(78) 97/78     Weight: 37.2 kg  (82 lb)  Body mass index is 16.01 kg/m².    Physical Exam   Constitutional: She is oriented to person, place, and time. She appears well-developed. She appears distressed.   Disheveled and appearing much older than stated age.     HENT:   Mouth/Throat: Oropharyngeal exudate present.   Eyes: Right eye exhibits discharge. Left eye exhibits discharge.   Neck: Normal range of motion. Neck supple. No JVD present. No tracheal deviation present. No thyromegaly present.   Cardiovascular: Normal rate, regular rhythm, normal heart sounds and intact distal pulses.   Pulmonary/Chest: Effort normal. No stridor. No respiratory distress. She has wheezes. She has no rales. She exhibits tenderness.   Abdominal: Soft. Bowel sounds are normal.   Musculoskeletal: Normal range of motion. She exhibits tenderness.   Lymphadenopathy:     She has no cervical adenopathy.   Neurological: She is alert and oriented to person, place, and time. A cranial nerve deficit is present.   Skin: Skin is warm and dry. Capillary refill takes less than 2 seconds.   Psychiatric: She has a normal mood and affect.   Nursing note reviewed.          Significant Labs: All pertinent labs within the past 24 hours have been reviewed.    Significant Imaging: I have reviewed and interpreted all pertinent imaging results/findings within the past 24 hours.    Assessment/Plan:     * Dehydration    Rehydrate with Normal Saline  Continue home meds  Monitor for Withdrawal symptoms  Empiric antibiotics for UTI              VTE Risk Mitigation (From admission, onward)    None             Uziel Allan MD  Department of Hospital Medicine   [facility] - ED

## 2018-08-21 NOTE — ASSESSMENT & PLAN NOTE
Rehydrate with Normal Saline  Continue home meds  Monitor for Withdrawal symptoms  Empiric antibiotics for UTI

## 2018-08-22 LAB
ALBUMIN SERPL BCP-MCNC: 2.3 G/DL
ALP SERPL-CCNC: 57 U/L
ALT SERPL W/O P-5'-P-CCNC: 15 U/L
AMMONIA PLAS-SCNC: 18 UMOL/L
ANION GAP SERPL CALC-SCNC: 8 MMOL/L
AST SERPL-CCNC: 22 U/L
BASOPHILS # BLD AUTO: 0.02 K/UL
BASOPHILS NFR BLD: 0.4 %
BILIRUB SERPL-MCNC: 0.5 MG/DL
BUN SERPL-MCNC: 9 MG/DL
CALCIUM SERPL-MCNC: 8.2 MG/DL
CHLORIDE SERPL-SCNC: 104 MMOL/L
CK SERPL-CCNC: 50 U/L
CO2 SERPL-SCNC: 24 MMOL/L
CREAT SERPL-MCNC: 0.6 MG/DL
DIFFERENTIAL METHOD: ABNORMAL
EOSINOPHIL # BLD AUTO: 0.1 K/UL
EOSINOPHIL NFR BLD: 1 %
ERYTHROCYTE [DISTWIDTH] IN BLOOD BY AUTOMATED COUNT: 12.1 %
EST. GFR  (AFRICAN AMERICAN): >60 ML/MIN/1.73 M^2
EST. GFR  (NON AFRICAN AMERICAN): >60 ML/MIN/1.73 M^2
GLUCOSE SERPL-MCNC: 102 MG/DL
HCT VFR BLD AUTO: 28.3 %
HGB BLD-MCNC: 9.3 G/DL
IMM GRANULOCYTES # BLD AUTO: 0.03 K/UL
IMM GRANULOCYTES NFR BLD AUTO: 0.6 %
LYMPHOCYTES # BLD AUTO: 0.9 K/UL
LYMPHOCYTES NFR BLD: 17.7 %
MCH RBC QN AUTO: 29.2 PG
MCHC RBC AUTO-ENTMCNC: 32.9 G/DL
MCV RBC AUTO: 89 FL
MONOCYTES # BLD AUTO: 0.7 K/UL
MONOCYTES NFR BLD: 13.1 %
NEUTROPHILS # BLD AUTO: 3.4 K/UL
NEUTROPHILS NFR BLD: 67.2 %
NRBC BLD-RTO: 0 /100 WBC
PLATELET # BLD AUTO: 218 K/UL
PMV BLD AUTO: 9.5 FL
POTASSIUM SERPL-SCNC: 2.9 MMOL/L
PROT SERPL-MCNC: 5.6 G/DL
RBC # BLD AUTO: 3.19 M/UL
SODIUM SERPL-SCNC: 136 MMOL/L
TSH SERPL DL<=0.005 MIU/L-ACNC: 1.21 UIU/ML
WBC # BLD AUTO: 5.03 K/UL

## 2018-08-22 PROCEDURE — S0077 INJECTION, CLINDAMYCIN PHOSP: HCPCS | Performed by: INTERNAL MEDICINE

## 2018-08-22 PROCEDURE — 94761 N-INVAS EAR/PLS OXIMETRY MLT: CPT

## 2018-08-22 PROCEDURE — 96367 TX/PROPH/DG ADDL SEQ IV INF: CPT

## 2018-08-22 PROCEDURE — 96376 TX/PRO/DX INJ SAME DRUG ADON: CPT

## 2018-08-22 PROCEDURE — 96375 TX/PRO/DX INJ NEW DRUG ADDON: CPT

## 2018-08-22 PROCEDURE — 94760 N-INVAS EAR/PLS OXIMETRY 1: CPT

## 2018-08-22 PROCEDURE — 84443 ASSAY THYROID STIM HORMONE: CPT

## 2018-08-22 PROCEDURE — G0378 HOSPITAL OBSERVATION PER HR: HCPCS

## 2018-08-22 PROCEDURE — S4991 NICOTINE PATCH NONLEGEND: HCPCS | Performed by: INTERNAL MEDICINE

## 2018-08-22 PROCEDURE — 36415 COLL VENOUS BLD VENIPUNCTURE: CPT

## 2018-08-22 PROCEDURE — 82140 ASSAY OF AMMONIA: CPT

## 2018-08-22 PROCEDURE — 63600175 PHARM REV CODE 636 W HCPCS: Performed by: INTERNAL MEDICINE

## 2018-08-22 PROCEDURE — 82550 ASSAY OF CK (CPK): CPT

## 2018-08-22 PROCEDURE — 85025 COMPLETE CBC W/AUTO DIFF WBC: CPT

## 2018-08-22 PROCEDURE — 96365 THER/PROPH/DIAG IV INF INIT: CPT

## 2018-08-22 PROCEDURE — C9113 INJ PANTOPRAZOLE SODIUM, VIA: HCPCS | Performed by: INTERNAL MEDICINE

## 2018-08-22 PROCEDURE — 96361 HYDRATE IV INFUSION ADD-ON: CPT

## 2018-08-22 PROCEDURE — 80053 COMPREHEN METABOLIC PANEL: CPT

## 2018-08-22 PROCEDURE — 25000003 PHARM REV CODE 250: Performed by: INTERNAL MEDICINE

## 2018-08-22 RX ORDER — CLORAZEPATE DIPOTASSIUM 7.5 MG/1
15 TABLET ORAL 2 TIMES DAILY
Status: DISCONTINUED | OUTPATIENT
Start: 2018-08-22 | End: 2018-08-24 | Stop reason: HOSPADM

## 2018-08-22 RX ORDER — CLINDAMYCIN PHOSPHATE 600 MG/50ML
600 INJECTION, SOLUTION INTRAVENOUS
Status: DISCONTINUED | OUTPATIENT
Start: 2018-08-22 | End: 2018-08-24 | Stop reason: HOSPADM

## 2018-08-22 RX ADMIN — TRAZODONE HYDROCHLORIDE 400 MG: 50 TABLET ORAL at 08:08

## 2018-08-22 RX ADMIN — NYSTATIN 500000 UNITS: 500000 SUSPENSION ORAL at 10:08

## 2018-08-22 RX ADMIN — CLINDAMYCIN IN 5 PERCENT DEXTROSE 600 MG: 12 INJECTION, SOLUTION INTRAVENOUS at 10:08

## 2018-08-22 RX ADMIN — ESCITALOPRAM OXALATE 20 MG: 10 TABLET, FILM COATED ORAL at 10:08

## 2018-08-22 RX ADMIN — CLORAZEPATE DIPOTASSIUM 15 MG: 7.5 TABLET ORAL at 08:08

## 2018-08-22 RX ADMIN — CEFTRIAXONE SODIUM 1 G: 1 INJECTION, POWDER, FOR SOLUTION INTRAMUSCULAR; INTRAVENOUS at 07:08

## 2018-08-22 RX ADMIN — QUETIAPINE FUMARATE 300 MG: 100 TABLET ORAL at 10:08

## 2018-08-22 RX ADMIN — SODIUM CHLORIDE: 9 INJECTION, SOLUTION INTRAVENOUS at 08:08

## 2018-08-22 RX ADMIN — NICOTINE 1 PATCH: 21 PATCH, EXTENDED RELEASE TRANSDERMAL at 10:08

## 2018-08-22 RX ADMIN — NYSTATIN 500000 UNITS: 500000 SUSPENSION ORAL at 01:08

## 2018-08-22 RX ADMIN — NYSTATIN 500000 UNITS: 500000 SUSPENSION ORAL at 05:08

## 2018-08-22 RX ADMIN — PANTOPRAZOLE SODIUM 40 MG: 40 INJECTION, POWDER, LYOPHILIZED, FOR SOLUTION INTRAVENOUS at 10:08

## 2018-08-22 RX ADMIN — Medication 100 MG: at 10:08

## 2018-08-22 RX ADMIN — SODIUM CHLORIDE: 9 INJECTION, SOLUTION INTRAVENOUS at 01:08

## 2018-08-22 RX ADMIN — Medication 1 TABLET: at 10:08

## 2018-08-22 RX ADMIN — AZITHROMYCIN MONOHYDRATE 500 MG: 500 INJECTION, POWDER, LYOPHILIZED, FOR SOLUTION INTRAVENOUS at 08:08

## 2018-08-22 RX ADMIN — CEFTRIAXONE SODIUM 1 G: 1 INJECTION, POWDER, FOR SOLUTION INTRAMUSCULAR; INTRAVENOUS at 10:08
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
## 2018-08-22 NOTE — ASSESSMENT & PLAN NOTE
Rehydrate with Normal Saline  Continue home meds  Monitor for Withdrawal symptoms  Empiric antibiotics for UTI      August 22, 2018:  Continue current antibioticsFor urinary tract infection                                Pursue psychiatric referral for this patient                                Follow labs in Am
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
## 2018-08-22 NOTE — HOSPITAL COURSE
8/22/2018: Much more alertT Today.  Patient is still havingTrouble keeping a train of thought, But mucous membranes up here moistAndSkin turgor is much better today  .  Patient denies any pain except for mild low back pain.  Patient is otherwise sitting up eating and tolerating her diet.  No complaints of pain    8/23/2018:  Patient stable awaiting sensetivities.  Otherwise ready for discharge
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
## 2018-08-22 NOTE — PLAN OF CARE
PT SIGNED HUANG AT 1205 TODAY.      08/22/18 1536   HUANG Message   Medicare Outpatient and Observation Notification regarding financial responsibility Given to patient/caregiver;Explained to patient/caregiver;Signed/date by patient/caregiver   Date HUANG was signed 08/22/18   Time HUANG was signed 1204

## 2018-08-22 NOTE — PLAN OF CARE
Problem: Fall Risk (Adult)  Goal: Identify Related Risk Factors and Signs and Symptoms  Related risk factors and signs and symptoms are identified upon initiation of Human Response Clinical Practice Guideline (CPG)   08/22/18 0138   Fall Risk   Related Risk Factors (Fall Risk) bladder function altered;environment unfamiliar;sleep pattern alteration;polypharmacy       Problem: Patient Care Overview  Goal: Plan of Care Review   08/22/18 0138   Coping/Psychosocial   Plan Of Care Reviewed With patient

## 2018-08-22 NOTE — PLAN OF CARE
Problem: Fall Risk (Adult)  Goal: Absence of Falls  Patient will demonstrate the desired outcomes by discharge/transition of care.   08/22/18 0139   Fall Risk (Adult)   Absence of Falls making progress toward outcome

## 2018-08-22 NOTE — PLAN OF CARE
Problem: Fall Risk (Adult)  Goal: Absence of Falls  Patient will demonstrate the desired outcomes by discharge/transition of care.  Outcome: Ongoing (interventions implemented as appropriate)   08/22/18 0136   Fall Risk (Adult)   Absence of Falls making progress toward outcome

## 2018-08-22 NOTE — SUBJECTIVE & OBJECTIVE
Interval History: Patient feeling much better today.  No acute distress    Review of Systems   Constitutional: Positive for activity change, appetite change, fatigue and unexpected weight change.        Very Disheveled with significant weight loss   HENT: Positive for congestion, dental problem, mouth sores, postnasal drip, rhinorrhea and sinus pressure. Negative for ear discharge, facial swelling, hearing loss, nosebleeds, sinus pain, sore throat, trouble swallowing and voice change.    Eyes: Positive for redness and itching. Negative for photophobia, pain and visual disturbance.   Respiratory: Positive for cough, chest tightness, shortness of breath and wheezing.    Cardiovascular: Positive for palpitations. Negative for chest pain and leg swelling.   Gastrointestinal: Positive for nausea. Negative for abdominal distention, abdominal pain, blood in stool, constipation and diarrhea.   Endocrine: Negative.    Genitourinary: Positive for dysuria, flank pain, frequency and urgency. Negative for decreased urine volume, difficulty urinating, genital sores, hematuria, menstrual problem, pelvic pain, vaginal bleeding and vaginal discharge.   Musculoskeletal: Positive for arthralgias, back pain and myalgias. Negative for gait problem and joint swelling.   Allergic/Immunologic: Negative.    Neurological: Positive for dizziness, weakness, light-headedness and headaches.   Hematological: Negative.    Psychiatric/Behavioral: Positive for agitation, decreased concentration and dysphoric mood. The patient is nervous/anxious.      Objective:     Vital Signs (Most Recent):  Temp: 97 °F (36.1 °C) (08/22/18 1034)  Pulse: 78 (08/22/18 1034)  Resp: 18 (08/22/18 1034)  BP: 116/68 (08/22/18 1034)  SpO2: 96 % (08/22/18 1034) Vital Signs (24h Range):  Temp:  [95.1 °F (35.1 °C)-98.7 °F (37.1 °C)] 97 °F (36.1 °C)  Pulse:  [78-95] 78  Resp:  [18-20] 18  SpO2:  [92 %-97 %] 96 %  BP: ()/(53-92) 116/68     Weight: 37.2 kg (82 lb)  Body  mass index is 16.01 kg/m².  No intake or output data in the 24 hours ending 08/22/18 1207   Physical Exam   Constitutional: She is oriented to person, place, and time. She appears well-developed and well-nourished.   HENT:   Head: Normocephalic and atraumatic.   Eyes: EOM are normal. Pupils are equal, round, and reactive to light.   Neck: Normal range of motion. Neck supple. No tracheal deviation present. No thyromegaly present.   Pulmonary/Chest: Effort normal and breath sounds normal.   Abdominal: Soft. Bowel sounds are normal. She exhibits no distension. There is no tenderness. There is no rebound and no guarding.   Musculoskeletal: Normal range of motion.   Lymphadenopathy:     She has no cervical adenopathy.   Neurological: She is alert and oriented to person, place, and time.   Skin: Skin is warm and dry. Capillary refill takes less than 2 seconds.   Psychiatric: She has a normal mood and affect. Her behavior is normal. Judgment and thought content normal.   Vitals reviewed.      Significant Labs: All pertinent labs within the past 24 hours have been reviewed.    Significant Imaging: I have reviewed and interpreted all pertinent imaging results/findings within the past 24 hours.

## 2018-08-22 NOTE — PROGRESS NOTES
[facility] Medicine  Progress Note    Patient Name: Bhavana Lion  MRN: [id number]  Patient Class: OP- Observation   Admission Date: 8/21/2018  Length of Stay: 0 days  Attending Physician: Uziel Allan MD  Primary Care Provider: Primary Doctor No        Subjective:     Principal Problem:Dehydration    HPI:  This is a 46 yo  Disheveled female that presented to ER with complaints of nausea, inability to sleep,  And appearing very dehydrated.  This patient has a history of Bipolar disorder, Depression,  Anxiety, Drug Abuse, and Hyponatremia.   Patient now states that she is extremely fatigued and has not eaten in days.  Complaining only that she wants to sleep.  Patient complains of headache and back ache.  Denies Dysuria or hematuria.  Denies Diarrhea but complains of nausea, no vomiting.    Hospital Course:  8/22/2018: Much more alertT Today.  Patient is still havingTrouble keeping a train of thought, But mucous membranes up here moistAndSkin turgor is much better today  .  Patient denies any pain except for mild low back pain.  Patient is otherwise sitting up eating and tolerating her diet.  No complaints of pain    Interval History: Patient feeling much better today.  No acute distress    Review of Systems   Constitutional: Positive for activity change, appetite change, fatigue and unexpected weight change.        Very Disheveled with significant weight loss   HENT: Positive for congestion, dental problem, mouth sores, postnasal drip, rhinorrhea and sinus pressure. Negative for ear discharge, facial swelling, hearing loss, nosebleeds, sinus pain, sore throat, trouble swallowing and voice change.    Eyes: Positive for redness and itching. Negative for photophobia, pain and visual disturbance.   Respiratory: Positive for cough, chest tightness, shortness of breath and wheezing.    Cardiovascular: Positive for palpitations. Negative for chest pain and leg swelling.    Gastrointestinal: Positive for nausea. Negative for abdominal distention, abdominal pain, blood in stool, constipation and diarrhea.   Endocrine: Negative.    Genitourinary: Positive for dysuria, flank pain, frequency and urgency. Negative for decreased urine volume, difficulty urinating, genital sores, hematuria, menstrual problem, pelvic pain, vaginal bleeding and vaginal discharge.   Musculoskeletal: Positive for arthralgias, back pain and myalgias. Negative for gait problem and joint swelling.   Allergic/Immunologic: Negative.    Neurological: Positive for dizziness, weakness, light-headedness and headaches.   Hematological: Negative.    Psychiatric/Behavioral: Positive for agitation, decreased concentration and dysphoric mood. The patient is nervous/anxious.      Objective:     Vital Signs (Most Recent):  Temp: 97 °F (36.1 °C) (08/22/18 1034)  Pulse: 78 (08/22/18 1034)  Resp: 18 (08/22/18 1034)  BP: 116/68 (08/22/18 1034)  SpO2: 96 % (08/22/18 1034) Vital Signs (24h Range):  Temp:  [95.1 °F (35.1 °C)-98.7 °F (37.1 °C)] 97 °F (36.1 °C)  Pulse:  [78-95] 78  Resp:  [18-20] 18  SpO2:  [92 %-97 %] 96 %  BP: ()/(53-92) 116/68     Weight: 37.2 kg (82 lb)  Body mass index is 16.01 kg/m².  No intake or output data in the 24 hours ending 08/22/18 1207   Physical Exam   Constitutional: She is oriented to person, place, and time. She appears well-developed and well-nourished.   HENT:   Head: Normocephalic and atraumatic.   Eyes: EOM are normal. Pupils are equal, round, and reactive to light.   Neck: Normal range of motion. Neck supple. No tracheal deviation present. No thyromegaly present.   Pulmonary/Chest: Effort normal and breath sounds normal.   Abdominal: Soft. Bowel sounds are normal. She exhibits no distension. There is no tenderness. There is no rebound and no guarding.   Musculoskeletal: Normal range of motion.   Lymphadenopathy:     She has no cervical adenopathy.   Neurological: She is alert and oriented  to person, place, and time.   Skin: Skin is warm and dry. Capillary refill takes less than 2 seconds.   Psychiatric: She has a normal mood and affect. Her behavior is normal. Judgment and thought content normal.   Vitals reviewed.      Significant Labs: All pertinent labs within the past 24 hours have been reviewed.    Significant Imaging: I have reviewed and interpreted all pertinent imaging results/findings within the past 24 hours.    Assessment/Plan:      * Dehydration    Rehydrate with Normal Saline  Continue home meds  Monitor for Withdrawal symptoms  Empiric antibiotics for UTI      August 22, 2018:  Continue current antibioticsFor urinary tract infection                                Pursue psychiatric referral for this patient                                Follow labs in Am                                            VTE Risk Mitigation (From admission, onward)        Ordered     IP VTE LOW RISK PATIENT  Once      08/21/18 2024              Uziel Allan MD  Department of Hospital Medicine   [facility]

## 2018-08-23 LAB
ALBUMIN SERPL BCP-MCNC: 2 G/DL
ALP SERPL-CCNC: 48 U/L
ALT SERPL W/O P-5'-P-CCNC: 16 U/L
ANION GAP SERPL CALC-SCNC: 6 MMOL/L
AST SERPL-CCNC: 18 U/L
BACTERIA UR CULT: NORMAL
BASOPHILS NFR BLD: 0 %
BILIRUB SERPL-MCNC: 0.1 MG/DL
BUN SERPL-MCNC: 6 MG/DL
CALCIUM SERPL-MCNC: 7.9 MG/DL
CHLORIDE SERPL-SCNC: 108 MMOL/L
CO2 SERPL-SCNC: 24 MMOL/L
CREAT SERPL-MCNC: 0.7 MG/DL
DIFFERENTIAL METHOD: ABNORMAL
EOSINOPHIL NFR BLD: 1 %
ERYTHROCYTE [DISTWIDTH] IN BLOOD BY AUTOMATED COUNT: 12.1 %
EST. GFR  (AFRICAN AMERICAN): >60 ML/MIN/1.73 M^2
EST. GFR  (NON AFRICAN AMERICAN): >60 ML/MIN/1.73 M^2
GLUCOSE SERPL-MCNC: 96 MG/DL
HCT VFR BLD AUTO: 25.2 %
HGB BLD-MCNC: 8.4 G/DL
IMM GRANULOCYTES # BLD AUTO: ABNORMAL 10*3/UL
IMM GRANULOCYTES NFR BLD AUTO: ABNORMAL %
LYMPHOCYTES NFR BLD: 32 %
MCH RBC QN AUTO: 29.4 PG
MCHC RBC AUTO-ENTMCNC: 33.3 G/DL
MCV RBC AUTO: 88 FL
MONOCYTES NFR BLD: 6 %
NEUTROPHILS NFR BLD: 61 %
NRBC BLD-RTO: 0 /100 WBC
PLATELET # BLD AUTO: 234 K/UL
PMV BLD AUTO: 10.1 FL
POTASSIUM SERPL-SCNC: 3.1 MMOL/L
PROT SERPL-MCNC: 5.1 G/DL
RBC # BLD AUTO: 2.86 M/UL
SODIUM SERPL-SCNC: 138 MMOL/L
WBC # BLD AUTO: 4.62 K/UL

## 2018-08-23 PROCEDURE — 63600175 PHARM REV CODE 636 W HCPCS: Performed by: INTERNAL MEDICINE

## 2018-08-23 PROCEDURE — 96376 TX/PRO/DX INJ SAME DRUG ADON: CPT

## 2018-08-23 PROCEDURE — 85027 COMPLETE CBC AUTOMATED: CPT

## 2018-08-23 PROCEDURE — 96366 THER/PROPH/DIAG IV INF ADDON: CPT

## 2018-08-23 PROCEDURE — 96361 HYDRATE IV INFUSION ADD-ON: CPT

## 2018-08-23 PROCEDURE — 94760 N-INVAS EAR/PLS OXIMETRY 1: CPT

## 2018-08-23 PROCEDURE — 25000003 PHARM REV CODE 250: Performed by: INTERNAL MEDICINE

## 2018-08-23 PROCEDURE — 85007 BL SMEAR W/DIFF WBC COUNT: CPT

## 2018-08-23 PROCEDURE — 80053 COMPREHEN METABOLIC PANEL: CPT

## 2018-08-23 PROCEDURE — C9113 INJ PANTOPRAZOLE SODIUM, VIA: HCPCS | Performed by: INTERNAL MEDICINE

## 2018-08-23 PROCEDURE — S4991 NICOTINE PATCH NONLEGEND: HCPCS | Performed by: INTERNAL MEDICINE

## 2018-08-23 PROCEDURE — G0378 HOSPITAL OBSERVATION PER HR: HCPCS

## 2018-08-23 PROCEDURE — S0077 INJECTION, CLINDAMYCIN PHOSP: HCPCS | Performed by: INTERNAL MEDICINE

## 2018-08-23 PROCEDURE — 94761 N-INVAS EAR/PLS OXIMETRY MLT: CPT

## 2018-08-23 PROCEDURE — 36415 COLL VENOUS BLD VENIPUNCTURE: CPT

## 2018-08-23 RX ORDER — POTASSIUM CHLORIDE 20 MEQ/15ML
40 SOLUTION ORAL ONCE
Status: COMPLETED | OUTPATIENT
Start: 2018-08-23 | End: 2018-08-23

## 2018-08-23 RX ADMIN — NICOTINE 1 PATCH: 21 PATCH, EXTENDED RELEASE TRANSDERMAL at 09:08

## 2018-08-23 RX ADMIN — CLINDAMYCIN IN 5 PERCENT DEXTROSE 600 MG: 12 INJECTION, SOLUTION INTRAVENOUS at 01:08

## 2018-08-23 RX ADMIN — SODIUM CHLORIDE: 9 INJECTION, SOLUTION INTRAVENOUS at 05:08

## 2018-08-23 RX ADMIN — TRAZODONE HYDROCHLORIDE 400 MG: 50 TABLET ORAL at 08:08

## 2018-08-23 RX ADMIN — CEFTRIAXONE SODIUM 1 G: 1 INJECTION, POWDER, FOR SOLUTION INTRAMUSCULAR; INTRAVENOUS at 07:08

## 2018-08-23 RX ADMIN — POTASSIUM CHLORIDE 40 MEQ: 20 SOLUTION ORAL at 05:08

## 2018-08-23 RX ADMIN — CLORAZEPATE DIPOTASSIUM 15 MG: 7.5 TABLET ORAL at 08:08

## 2018-08-23 RX ADMIN — CLINDAMYCIN IN 5 PERCENT DEXTROSE 600 MG: 12 INJECTION, SOLUTION INTRAVENOUS at 11:08

## 2018-08-23 RX ADMIN — NYSTATIN 500000 UNITS: 500000 SUSPENSION ORAL at 01:08

## 2018-08-23 RX ADMIN — NYSTATIN 500000 UNITS: 500000 SUSPENSION ORAL at 09:08

## 2018-08-23 RX ADMIN — CLORAZEPATE DIPOTASSIUM 15 MG: 7.5 TABLET ORAL at 09:08

## 2018-08-23 RX ADMIN — CLINDAMYCIN IN 5 PERCENT DEXTROSE 600 MG: 12 INJECTION, SOLUTION INTRAVENOUS at 05:08

## 2018-08-23 RX ADMIN — QUETIAPINE FUMARATE 300 MG: 100 TABLET ORAL at 09:08

## 2018-08-23 RX ADMIN — Medication 100 MG: at 09:08

## 2018-08-23 RX ADMIN — ESCITALOPRAM OXALATE 20 MG: 10 TABLET, FILM COATED ORAL at 09:08

## 2018-08-23 RX ADMIN — PANTOPRAZOLE SODIUM 40 MG: 40 INJECTION, POWDER, LYOPHILIZED, FOR SOLUTION INTRAVENOUS at 09:08

## 2018-08-23 RX ADMIN — NYSTATIN 500000 UNITS: 500000 SUSPENSION ORAL at 05:08

## 2018-08-23 RX ADMIN — CEFTRIAXONE SODIUM 1 G: 1 INJECTION, POWDER, FOR SOLUTION INTRAMUSCULAR; INTRAVENOUS at 09:08

## 2018-08-23 RX ADMIN — Medication 1 TABLET: at 10:08

## 2018-08-23 RX ADMIN — AZITHROMYCIN MONOHYDRATE 500 MG: 500 INJECTION, POWDER, LYOPHILIZED, FOR SOLUTION INTRAVENOUS at 08:08

## 2018-08-23 RX ADMIN — NYSTATIN 500000 UNITS: 500000 SUSPENSION ORAL at 08:08
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
## 2018-08-23 NOTE — PLAN OF CARE
Problem: Fluid Volume Deficit (Adult)  Intervention: Monitor/Manage Hypovolemia   08/23/18 0148   Coping/Psychosocial Interventions   Environmental Support calm environment promoted;caregiver consistency promoted   Nutrition Interventions   Fluid/Electrolyte Management intravenous fluid replacement initiated;fluids provided

## 2018-08-23 NOTE — NURSING
MICRO AT MAIN CAMPUS CALL WITH POSITIVE CULTURE IN ANEROBIC BOTTLE COLLECTED ON 8/21. GRAM POS COCCI IN CLUSTERS THAT RESEMBLED STAPH. MD NOTIFIED. NEW ORDERS RECEIVED. CLINDAMYCIN 600 MG IV Q8HR. NEW ORDERS IMPLEMENTED. WILL CONTINUE TO MONITOR PT

## 2018-08-23 NOTE — PLAN OF CARE
Problem: Patient Care Overview  Goal: Plan of Care Review  Outcome: Ongoing (interventions implemented as appropriate)   08/22/18 1922   Coping/Psychosocial   Plan Of Care Reviewed With patient;friend;son

## 2018-08-23 NOTE — PLAN OF CARE
Problem: Fall Risk (Adult)  Goal: Absence of Falls  Patient will demonstrate the desired outcomes by discharge/transition of care.  Outcome: Ongoing (interventions implemented as appropriate)   08/22/18 6009   Fall Risk (Adult)   Absence of Falls making progress toward outcome

## 2018-08-24 VITALS
RESPIRATION RATE: 18 BRPM | OXYGEN SATURATION: 95 % | BODY MASS INDEX: 16.1 KG/M2 | TEMPERATURE: 98 F | HEIGHT: 60 IN | HEART RATE: 80 BPM | SYSTOLIC BLOOD PRESSURE: 98 MMHG | WEIGHT: 82 LBS | DIASTOLIC BLOOD PRESSURE: 49 MMHG

## 2018-08-24 PROCEDURE — S0077 INJECTION, CLINDAMYCIN PHOSP: HCPCS | Performed by: INTERNAL MEDICINE

## 2018-08-24 PROCEDURE — G0378 HOSPITAL OBSERVATION PER HR: HCPCS

## 2018-08-24 PROCEDURE — C9113 INJ PANTOPRAZOLE SODIUM, VIA: HCPCS | Performed by: INTERNAL MEDICINE

## 2018-08-24 PROCEDURE — 96376 TX/PRO/DX INJ SAME DRUG ADON: CPT

## 2018-08-24 PROCEDURE — 63600175 PHARM REV CODE 636 W HCPCS: Performed by: INTERNAL MEDICINE

## 2018-08-24 PROCEDURE — S4991 NICOTINE PATCH NONLEGEND: HCPCS | Performed by: INTERNAL MEDICINE

## 2018-08-24 PROCEDURE — 25000003 PHARM REV CODE 250: Performed by: INTERNAL MEDICINE

## 2018-08-24 RX ORDER — QUETIAPINE FUMARATE 300 MG/1
300 TABLET, FILM COATED ORAL DAILY
Qty: 30 TABLET | Refills: 11 | Status: SHIPPED | OUTPATIENT
Start: 2018-08-24 | End: 2019-08-24

## 2018-08-24 RX ORDER — POTASSIUM CHLORIDE 20 MEQ/1
20 TABLET, EXTENDED RELEASE ORAL 2 TIMES DAILY
Qty: 10 TABLET | Refills: 0 | Status: SHIPPED | OUTPATIENT
Start: 2018-08-24

## 2018-08-24 RX ORDER — TRAZODONE HYDROCHLORIDE 100 MG/1
400 TABLET ORAL NIGHTLY
Qty: 120 TABLET | Refills: 11 | Status: SHIPPED | OUTPATIENT
Start: 2018-08-24 | End: 2019-08-24

## 2018-08-24 RX ORDER — CLINDAMYCIN HYDROCHLORIDE 300 MG/1
300 CAPSULE ORAL EVERY 6 HOURS
Qty: 40 CAPSULE | Refills: 0 | Status: SHIPPED | OUTPATIENT
Start: 2018-08-24

## 2018-08-24 RX ORDER — OXCARBAZEPINE 600 MG/1
600 TABLET, FILM COATED ORAL 2 TIMES DAILY
Qty: 60 TABLET | Refills: 11 | Status: SHIPPED | OUTPATIENT
Start: 2018-08-24 | End: 2019-08-24

## 2018-08-24 RX ADMIN — CLINDAMYCIN IN 5 PERCENT DEXTROSE 600 MG: 12 INJECTION, SOLUTION INTRAVENOUS at 05:08

## 2018-08-24 RX ADMIN — Medication 1 TABLET: at 10:08

## 2018-08-24 RX ADMIN — PANTOPRAZOLE SODIUM 40 MG: 40 INJECTION, POWDER, LYOPHILIZED, FOR SOLUTION INTRAVENOUS at 10:08

## 2018-08-24 RX ADMIN — CLORAZEPATE DIPOTASSIUM 15 MG: 7.5 TABLET ORAL at 10:08

## 2018-08-24 RX ADMIN — CEFTRIAXONE SODIUM 1 G: 1 INJECTION, POWDER, FOR SOLUTION INTRAMUSCULAR; INTRAVENOUS at 07:08

## 2018-08-24 RX ADMIN — Medication 100 MG: at 10:08

## 2018-08-24 RX ADMIN — NICOTINE 1 PATCH: 21 PATCH, EXTENDED RELEASE TRANSDERMAL at 09:08

## 2018-08-24 RX ADMIN — ACETAMINOPHEN 650 MG: 325 TABLET ORAL at 06:08

## 2018-08-24 RX ADMIN — ESCITALOPRAM OXALATE 20 MG: 10 TABLET, FILM COATED ORAL at 10:08

## 2018-08-24 RX ADMIN — NYSTATIN 500000 UNITS: 500000 SUSPENSION ORAL at 10:08

## 2018-08-24 RX ADMIN — QUETIAPINE FUMARATE 300 MG: 100 TABLET ORAL at 10:08

## 2018-08-24 NOTE — DISCHARGE SUMMARY
[facility]  Hospital Medicine  Discharge Summary      Patient Name: Bhavana Lion  MRN: [id number]  Admission Date: 8/21/2018  Hospital Length of Stay: 0 days  Discharge Date and Time:  08/24/2018 9:49 AM  Attending Physician: Kristin Mary MD   Discharging Provider: Kristin Mary MD  Primary Care Provider: Primary Doctor No      HPI:   This is a 48 yo  Disheveled female that presented to ER with complaints of nausea, inability to sleep,  And appearing very dehydrated.  This patient has a history of Bipolar disorder, Depression,  Anxiety, Drug Abuse, and Hyponatremia.   Patient now states that she is extremely fatigued and has not eaten in days.  Complaining only that she wants to sleep.  Patient complains of headache and back ache.  Denies Dysuria or hematuria.  Denies Diarrhea but complains of nausea, no vomiting.    * No surgery found *      Hospital Course:   8/22/2018: Much more alertT Today.  Patient is still havingTrouble keeping a train of thought, But mucous membranes up here moistAndSkin turgor is much better today  .  Patient denies any pain except for mild low back pain.  Patient is otherwise sitting up eating and tolerating her diet.  No complaints of pain    8/23/2018:  Patient stable awaiting sensetivities.  Otherwise ready for discharge         Consults:   Consults (From admission, onward)        Status Ordering Provider     IP consult to case management  Once     Provider:  (Not yet assigned)    KRISTIN Lange     IP consult to case management  Once     Provider:  (Not yet assigned)    Acknowledged KRISTIN MARY          * Dehydration    Rehydrate with Normal Saline  Continue home meds  Monitor for Withdrawal symptoms  Empiric antibiotics for UTI      August 22, 2018:  Continue current antibioticsFor urinary tract infection                                Pursue psychiatric referral for this patient                                Follow labs in  Am    August 23, 1018:  Await sensetivities                               Otherwise ready for discharge    August 24, 2018:  Discharge to home                               Meds refilled to [facility]                               FU with PCP in 1 week                                              Final Active Diagnoses:    Diagnosis Date Noted POA    PRINCIPAL PROBLEM:  Dehydration [E86.0] 08/21/2018 Unknown    Fatigue due to depression [F32.9, R53.83] 08/21/2018 Yes    UTI (urinary tract infection) [N39.0] 08/21/2018 Unknown      Problems Resolved During this Admission:       Discharged Condition: good    Disposition:     Follow Up:  Follow-up Information     Sole Bang NP On 8/28/2018.    Specialty:  Family Medicine  Why:  appt time is 1:00 correct phone number is [phone]  Contact information:  [address] [place] [address] [phone]             Primary Doctor No In 1 week.               Patient Instructions:   No discharge procedures on file.    Significant Diagnostic Studies: Labs: All labs within the past 24 hours have been reviewed    Pending Diagnostic Studies:     None         Medications:  Reconciled Home Medications:      Medication List      START taking these medications    clindamycin 300 MG capsule  Commonly known as:  CLEOCIN  Take 1 capsule (300 mg total) by mouth every 6 (six) hours.        CHANGE how you take these medications    * QUEtiapine 300 MG Tab  Commonly known as:  SEROQUEL  Take by mouth.  What changed:  Another medication with the same name was added. Make sure you understand how and when to take each.     * QUEtiapine 300 MG Tab  Commonly known as:  SEROQUEL  Take 1 tablet (300 mg total) by mouth once daily.  What changed:  You were already taking a medication with the same name, and this prescription was added. Make sure you understand how and when to take each.     * traZODone 100 MG tablet  Commonly known as:  DESYREL  Take 400 mg by mouth every  evening.  What changed:  Another medication with the same name was added. Make sure you understand how and when to take each.     * traZODone 100 MG tablet  Commonly known as:  DESYREL  Take 4 tablets (400 mg total) by mouth every evening.  What changed:  You were already taking a medication with the same name, and this prescription was added. Make sure you understand how and when to take each.     * TRILEPTAL 600 MG Tab  Generic drug:  OXcarbazepine  Take 600 mg by mouth 2 (two) times daily.  What changed:  Another medication with the same name was added. Make sure you understand how and when to take each.     * OXcarbazepine 600 MG Tab  Commonly known as:  TRILEPTAL  Take 1 tablet (600 mg total) by mouth 2 (two) times daily.  What changed:  You were already taking a medication with the same name, and this prescription was added. Make sure you understand how and when to take each.         * This list has 6 medication(s) that are the same as other medications prescribed for you. Read the directions carefully, and ask your doctor or other care provider to review them with you.            CONTINUE taking these medications    escitalopram oxalate 20 MG tablet  Commonly known as:  LEXAPRO  Take 20 mg by mouth once daily.            Indwelling Lines/Drains at time of discharge:   Lines/Drains/Airways          None          Time spent on the discharge of patient: 30 minutes  Patient was seen and examined on the date of discharge and determined to be suitable for discharge.         Uziel Allan MD  Department of Hospital Medicine  [facility]

## 2018-08-24 NOTE — NURSING
Discharge orders received and reviewed with patient. Pt verbalized understanding. 22G LFA dc'ed, catheter intact, and pressure dressing applied. Pt tolerated procedure well. Pt calling family/friends for ride home. Awaiting patient ride.

## 2018-08-24 NOTE — ASSESSMENT & PLAN NOTE
Rehydrate with Normal Saline  Continue home meds  Monitor for Withdrawal symptoms  Empiric antibiotics for UTI      August 22, 2018:  Continue current antibioticsFor urinary tract infection                                Pursue psychiatric referral for this patient                                Follow labs in Am    August 23, 1018:  Await sensetivities                               Otherwise ready for discharge    August 24, 2018:  Discharge to home                               Meds refilled to [facility]                               FU with PCP in 1 week

## 2018-08-24 NOTE — PLAN OF CARE
Problem: Fluid Volume Deficit (Adult)  Intervention: Monitor/Manage Hypovolemia   08/23/18 6747   Coping/Psychosocial Interventions   Environmental Support calm environment promoted;caregiver consistency promoted   Nutrition Interventions   Fluid/Electrolyte Management intravenous fluid replacement initiated

## 2018-08-24 NOTE — PROGRESS NOTES
[facility] Medicine  Progress Note    Patient Name: Bhavana Lion  MRN: [id number]  Patient Class: OP- Observation   Admission Date: 8/21/2018  Length of Stay: 0 days  Attending Physician: Uziel Allan MD  Primary Care Provider: Primary Doctor No        Subjective:     Principal Problem:Dehydration    HPI:  This is a 48 yo  Disheveled female that presented to ER with complaints of nausea, inability to sleep,  And appearing very dehydrated.  This patient has a history of Bipolar disorder, Depression,  Anxiety, Drug Abuse, and Hyponatremia.   Patient now states that she is extremely fatigued and has not eaten in days.  Complaining only that she wants to sleep.  Patient complains of headache and back ache.  Denies Dysuria or hematuria.  Denies Diarrhea but complains of nausea, no vomiting.    Hospital Course:  8/22/2018: Much more alertT Today.  Patient is still havingTrouble keeping a train of thought, But mucous membranes up here moistAndSkin turgor is much better today  .  Patient denies any pain except for mild low back pain.  Patient is otherwise sitting up eating and tolerating her diet.  No complaints of pain    8/23/2018:  Patient stable awaiting sensetivities.  Otherwise ready for discharge        No new subjective & objective note has been filed under this hospital service since the last note was generated.    Assessment/Plan:      * Dehydration    Rehydrate with Normal Saline  Continue home meds  Monitor for Withdrawal symptoms  Empiric antibiotics for UTI      August 22, 2018:  Continue current antibioticsFor urinary tract infection                                Pursue psychiatric referral for this patient                                Follow labs in Am    August 23, 1018:  Await sensetivities                               Otherwise ready for discharge                                            VTE Risk Mitigation (From admission, onward)        Ordered     IP  VTE LOW RISK PATIENT  Once      08/21/18 2024              Uziel Allan MD  Department of Hospital Medicine   [facility] - Med Surg

## 2018-08-24 NOTE — ASSESSMENT & PLAN NOTE
Rehydrate with Normal Saline  Continue home meds  Monitor for Withdrawal symptoms  Empiric antibiotics for UTI      August 22, 2018:  Continue current antibioticsFor urinary tract infection                                Pursue psychiatric referral for this patient                                Follow labs in Am    August 23, 1018:  Await sensetivities                               Otherwise ready for discharge

## 2018-08-25 LAB
BACTERIA BLD CULT: NORMAL